# Patient Record
Sex: MALE | Race: WHITE | Employment: OTHER | URBAN - METROPOLITAN AREA
[De-identification: names, ages, dates, MRNs, and addresses within clinical notes are randomized per-mention and may not be internally consistent; named-entity substitution may affect disease eponyms.]

---

## 2022-11-29 ENCOUNTER — OFFICE VISIT (OUTPATIENT)
Dept: NEUROLOGY | Age: 67
End: 2022-11-29
Payer: MEDICARE

## 2022-11-29 VITALS
HEIGHT: 74 IN | HEART RATE: 80 BPM | WEIGHT: 213 LBS | BODY MASS INDEX: 27.34 KG/M2 | SYSTOLIC BLOOD PRESSURE: 152 MMHG | DIASTOLIC BLOOD PRESSURE: 84 MMHG

## 2022-11-29 DIAGNOSIS — R29.2 HYPERREFLEXIA: ICD-10-CM

## 2022-11-29 DIAGNOSIS — G81.91 RIGHT HEMIPARESIS (HCC): ICD-10-CM

## 2022-11-29 DIAGNOSIS — R26.9 GAIT DISTURBANCE: Primary | ICD-10-CM

## 2022-11-29 DIAGNOSIS — R25.1 TREMOR OF BOTH HANDS: ICD-10-CM

## 2022-11-29 PROCEDURE — 1036F TOBACCO NON-USER: CPT | Performed by: PSYCHIATRY & NEUROLOGY

## 2022-11-29 PROCEDURE — G8427 DOCREV CUR MEDS BY ELIG CLIN: HCPCS | Performed by: PSYCHIATRY & NEUROLOGY

## 2022-11-29 PROCEDURE — 3017F COLORECTAL CA SCREEN DOC REV: CPT | Performed by: PSYCHIATRY & NEUROLOGY

## 2022-11-29 PROCEDURE — 1123F ACP DISCUSS/DSCN MKR DOCD: CPT | Performed by: PSYCHIATRY & NEUROLOGY

## 2022-11-29 PROCEDURE — G8417 CALC BMI ABV UP PARAM F/U: HCPCS | Performed by: PSYCHIATRY & NEUROLOGY

## 2022-11-29 PROCEDURE — 99205 OFFICE O/P NEW HI 60 MIN: CPT | Performed by: PSYCHIATRY & NEUROLOGY

## 2022-11-29 PROCEDURE — G8484 FLU IMMUNIZE NO ADMIN: HCPCS | Performed by: PSYCHIATRY & NEUROLOGY

## 2022-11-29 RX ORDER — TRAMADOL HYDROCHLORIDE 50 MG/1
TABLET ORAL
COMMUNITY
Start: 2022-10-29

## 2022-11-29 RX ORDER — LORATADINE 10 MG/1
TABLET ORAL
COMMUNITY

## 2022-11-29 RX ORDER — CARVEDILOL 3.12 MG/1
TABLET ORAL
COMMUNITY
Start: 2022-10-25

## 2022-11-29 RX ORDER — FLUTICASONE PROPIONATE AND SALMETEROL 250; 50 UG/1; UG/1
POWDER RESPIRATORY (INHALATION)
COMMUNITY
Start: 2022-11-28

## 2022-11-29 RX ORDER — BUTALBITAL, ACETAMINOPHEN AND CAFFEINE 50; 325; 40 MG/1; MG/1; MG/1
TABLET ORAL
COMMUNITY
Start: 2022-11-18

## 2022-11-29 RX ORDER — LORAZEPAM 1 MG/1
1 TABLET ORAL DAILY
COMMUNITY
Start: 2017-01-31

## 2022-11-29 RX ORDER — OMEPRAZOLE 20 MG/1
CAPSULE, DELAYED RELEASE ORAL
COMMUNITY
Start: 2022-10-24

## 2022-11-29 RX ORDER — BUSPIRONE HYDROCHLORIDE 5 MG/1
TABLET ORAL
COMMUNITY
Start: 2022-11-28

## 2022-11-29 RX ORDER — ROSUVASTATIN CALCIUM 20 MG/1
TABLET, COATED ORAL
COMMUNITY
Start: 2022-10-25

## 2022-11-29 RX ORDER — CITALOPRAM 40 MG/1
TABLET ORAL
COMMUNITY
Start: 2022-11-18

## 2022-11-29 RX ORDER — GABAPENTIN 300 MG/1
CAPSULE ORAL
COMMUNITY
Start: 2022-11-10

## 2022-11-29 RX ORDER — FLUTICASONE PROPIONATE AND SALMETEROL 100; 50 UG/1; UG/1
POWDER RESPIRATORY (INHALATION)
COMMUNITY
Start: 2017-10-11

## 2022-11-29 RX ORDER — HYDROCODONE BITARTRATE AND ACETAMINOPHEN 7.5; 325 MG/1; MG/1
TABLET ORAL
COMMUNITY
Start: 2022-11-05

## 2022-11-29 RX ORDER — NITROGLYCERIN 400 UG/1
1 SPRAY ORAL EVERY 5 MIN PRN
COMMUNITY
Start: 2022-09-12

## 2022-11-29 NOTE — PROGRESS NOTES
Gay Kras  [address] [place], [address] [place] [address]  Phone: [phone] Fax [phone]  Won Cast MD      Patient: Kami Cordero  Provider: Won Cast MD    CC:   Chief Complaint   Patient presents with    New Patient    Tremors    Headache     Referring Provider:    History of Present Illness:     Kami Cordero is a 78 y.o. RH male who presents for evaluation of tremors. He is accompanied by his spouse. Patient presents for further evaluation of tremors. Tremor has been present in the bilateral hands, worse on the right, and appears to have both resting and action components. Symptoms at present over the last couple of years. Additionally there is been some weakness and stiffness of the upper extremities. Family history includes a mother with Parkinson's disease. There has been worsening gait disturbance with loss of balance and increased risk of falls. No symptoms suggestive of freezing of gait. He has had assistive devices but is able to walk unassisted. 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 No other visual disturbances. No significant speech or swallowing disturbances. Additionally there has been a history of persistent headaches localized in the bifrontal temporal regions. There is no prior history of chronic migraine. Currently taking Fioricet which have been given to him by an outside provider and taking it regularly, which only provides transient benefit. Cognitively there have been mild short-term memory deficits but no significant neuropsychiatric disturbances. No visual hallucinations. No RBD. Past medical history includes coronary artery bypass graft and continues with cardiology follow-up. Review of Systems:   Review of Systems   Constitutional:  Positive for fatigue. Negative for fever. HENT:  Negative for drooling. Eyes:  Negative for visual disturbance. Respiratory:  Negative for shortness of breath. Cardiovascular:  Negative for chest pain. Gastrointestinal:  Negative for abdominal pain. Genitourinary:  Negative for dysuria. Musculoskeletal:  Positive for gait problem and neck pain. Skin:  Negative for rash. Allergic/Immunologic: Negative for immunocompromised state. Neurological:  Positive for tremors and weakness. Psychiatric/Behavioral:  Positive for decreased concentration. Lab/Imaging Review:   I REVIEWED PERTINENT LABS, IMAGES, AND REPORTS WITH THE PATIENT PERSONALLY, DIRECTLY AND FULLY. THE MOST PERTINENT FINDINGS ARE NOTED BELOW:    Labs July 2022:  B12 638. CMP unremarkable. Past Medical History:     Past medical history, surgical history, social history, family history, medications, and allergies were reviewed and updated as appropriate.      PAST MEDICAL HISTORY:  Past Medical History:   Diagnosis Date    CAD (coronary artery disease)     COPD (chronic obstructive pulmonary disease) ([facility] [address]     Hyperlipidemia     Hypertension      PAST SURGICAL HISTORY:   Past Surgical History:   Procedure Laterality Date    CORONARY ARTERY BYPASS GRAFT       FAMILY HISTORY:  Family History   Problem Relation Age of Onset    Parkinson's Disease Mother     Heart Disease Brother       SOCIAL HISTORY:  Social History     Socioeconomic History    Marital status:      Spouse name: None    Number of children: None    Years of education: None    Highest education level: None   Tobacco Use    Smoking status: Never     Passive exposure: Never    Smokeless tobacco: Never   Substance and Sexual Activity    Alcohol use: Never    Drug use: Never       Medications/Allergies:     MEDICATIONS:   Outpatient Encounter Medications as of 11/29/2022   Medication Sig Dispense Refill    busPIRone (BUSPAR) 5 MG tablet       butalbital-acetaminophen-caffeine (FIORICET, ESGIC) -40 MG per tablet       carvedilol (COREG) 3.125 MG tablet       citalopram (CELEXA) 40 MG tablet       ELDERBERRY PO Take by mouth daily      fluticasone-salmeterol (ADVAIR) 250-50 MCG/ACT AEPB diskus inhaler       gabapentin (NEURONTIN) 300 MG capsule       HYDROcodone-acetaminophen (NORCO) 7.5-325 MG per tablet       loratadine (CLARITIN) 10 MG tablet Loratadine 10 MG Oral Tablet  TAKE 1 TABLET DAILY. Refills: 0    Active      LORazepam (ATIVAN) 1 MG tablet Take 1 mg by mouth daily. nitroGLYCERIN (NITROLINGUAL) 0.4 MG/SPRAY 0.4 mg spray Place 1 spray under the tongue every 5 minutes as needed      omeprazole (PRILOSEC) 20 MG delayed release capsule       rosuvastatin (CRESTOR) 20 MG tablet       traMADol (ULTRAM) 50 MG tablet       vitamin E 600 units capsule Vitamin E CAPS  TAKE 1 CAPSULE DAILY. Refills: 0    Active      ZOLPIDEM TARTRATE ER PO Take 10 mg by mouth nightly as needed      ASPIRIN 81 PO Aspirin 81 MG TABS  TAKE 1 TABLET DAILY. Refills: 0    Active      fluticasone-salmeterol (ADVAIR) 100-50 MCG/ACT AEPB diskus inhaler        No facility-administered encounter medications on file as of 11/29/2022. ALLERGIES:  No Known Allergies      Physical Exam:     BP (!) 152/84   Pulse 80   Ht 6' 2\" (1.88 m)   Wt 213 lb (96.6 kg)   BMI 27.35 kg/m²     General Exam:  General: Well developed, well nourished, in no apparent distress. HEENT: Normocephalic, atraumatic. Sclera anicteric. Oropharynx clear. Neck: Supple without masses  Cardiovascular: Regular rate and rhythm. No carotid bruits. Lungs: Non-labored breathing. Abdomen: Soft, nontender, nondistended. Extremities: Peripheral pulses intact. No edema and no rashes. Neurological Exam:     MS/Language/Speech:  Alert. Oriented x 3. Language fluent. Speech relatively clear. Cranial Nerves: PERRL. L eye enucleation (baseline). Right eye movements full with normal pursuits. No nystagmus. Face was symmetric with good activation and normal sensation. Tongue and palate were midline. Shoulder shrug symmetric. Motor:  There is mild weakness on the right upper extremity with slight pronator drift. Mild weakness with hip flexion bilaterally worse on the right. Tone was relatively normal in the upper extremities with no cogwheeling or spasticity. Mild elevation of tone in the lower extremities. Abnormal Movements: There is an irregular, jerky appearing tremor of the hands at rest, more noticeable on the right but not rhythmic. It becomes more prominent with hands outstretched and there is an action component with finger-nose-finger bilaterally but again not very rhythmic in appearance. No obvious dystonic posturing was noted. Rapid alternating movements do show some impaired coordination bilaterally in both upper and lower extremities particularly with foot tapping which were impaired on the right. Sensory: Normal to light touch throughout. Cerebellar: No ataxia or dysmetria with finger-nose-finger bilaterally. Reflexes (R/L): Biceps (2+/2+), Brachioradialis (2+/2+), Patellar (2+/2+), Ankle (3+/3+). Few beats of ankle clonus bilaterally, more prominent on the right. Leal's was negative. Gait: He can rise from a seated position without assistance but uses his arms. Posture is relatively upright. He has an unsteady gait with external rotation of the right lower extremity and a somewhat antalgic appearance. There is some reduced activation on the right particularly in the lower extremity. The above-mentioned tremors are present when walking. Arm swing looks relatively symmetric. No freezing or festination. Assessment and Plan:     General Pires is a 78 y.o. male who presents with the following issues:     Padmini Dangelo was seen today for new patient, tremors and headache. Diagnoses and all orders for this visit:    Gait disturbance    Right hemiparesis (HCC)    Tremor of both hands    Hyperreflexia      Patient presents for further evaluation of tremor of the hands.   His neurologic exam as noted above remarkable for mild hemiparesis, worse on the right in addition to some increased tone and hyperreflexia which would be concerning for CNS process. There could be some mild elements of parkinsonism though again this is confounded by the other features as noted above. Therefore I recommended a broad work-up to include MRI of the brain and cervical spine for further evaluation for any evidence of cerebrovascular ischemia for cervical myelopathy. We will hold off on any medication changes at this time until more data is obtained. Headaches do not sound consistent with chronic migraine and new onset headaches at this age would be atypical.  Therefore recommending the imaging as noted above. Counseled on fall precautions. Would highly recommend trials of physical therapy but we will try to obtain more data initially before we move forward with any other referrals. Follow up to be arranged. Signature: Rox Ceja MD      Date:  12/1/2022    [facility]   [facility]  Ph: [phone]  Fax: [phone]         I have personally interviewed and examined Mr. Laney Oshea and I have personally reviewed all relevant records including labs and imaging as noted above. I have written all aspects of this note. More than 50% of this time was used for counseling regarding my diagnosis, prognosis, and plans for management. Total visit time: 65 minutes.

## 2022-12-01 ASSESSMENT — ENCOUNTER SYMPTOMS
ABDOMINAL PAIN: 0
SHORTNESS OF BREATH: 0

## 2022-12-17 ENCOUNTER — HOSPITAL ENCOUNTER (OUTPATIENT)
Dept: MRI IMAGING | Age: 67
End: 2022-12-17
Payer: MEDICARE

## 2022-12-17 DIAGNOSIS — G81.91 RIGHT HEMIPARESIS (HCC): ICD-10-CM

## 2022-12-17 DIAGNOSIS — R25.1 TREMOR OF BOTH HANDS: ICD-10-CM

## 2022-12-17 DIAGNOSIS — R29.2 HYPERREFLEXIA: ICD-10-CM

## 2022-12-17 PROCEDURE — 6360000004 HC RX CONTRAST MEDICATION: Performed by: PSYCHIATRY & NEUROLOGY

## 2022-12-17 PROCEDURE — 72156 MRI NECK SPINE W/O & W/DYE: CPT

## 2022-12-17 PROCEDURE — 70553 MRI BRAIN STEM W/O & W/DYE: CPT

## 2022-12-17 PROCEDURE — A9579 GAD-BASE MR CONTRAST NOS,1ML: HCPCS | Performed by: PSYCHIATRY & NEUROLOGY

## 2022-12-17 RX ADMIN — GADOTERIDOL 19 ML: 279.3 INJECTION, SOLUTION INTRAVENOUS at 11:55

## 2022-12-21 ENCOUNTER — TELEPHONE (OUTPATIENT)
Dept: NEUROLOGY | Age: 67
End: 2022-12-21

## 2022-12-21 NOTE — TELEPHONE ENCOUNTER
Patients wife called to get results of husbands MRI from 12/17/22. Advised Dr Jose Flannery would call her with results after he has read them. She verbalized understanding.

## 2023-01-03 ENCOUNTER — TELEPHONE (OUTPATIENT)
Dept: NEUROLOGY | Age: 68
End: 2023-01-03

## 2023-01-10 NOTE — TELEPHONE ENCOUNTER
Spoke to patient and briefly discussed results of the MRI brain and cervical spine. Suggested follow-up in the office to discuss the next steps. Please make a follow-up appointment on 1/27 at noon.

## 2023-01-12 ENCOUNTER — TELEPHONE (OUTPATIENT)
Dept: NEUROLOGY | Age: 68
End: 2023-01-12

## 2023-01-27 ENCOUNTER — OFFICE VISIT (OUTPATIENT)
Dept: NEUROLOGY | Age: 68
End: 2023-01-27
Payer: MEDICARE

## 2023-01-27 VITALS
DIASTOLIC BLOOD PRESSURE: 80 MMHG | HEART RATE: 68 BPM | HEIGHT: 74 IN | WEIGHT: 212 LBS | SYSTOLIC BLOOD PRESSURE: 110 MMHG | BODY MASS INDEX: 27.21 KG/M2

## 2023-01-27 DIAGNOSIS — R26.9 GAIT DISTURBANCE: ICD-10-CM

## 2023-01-27 DIAGNOSIS — R90.89 ABNORMAL FINDING ON MRI OF BRAIN: ICD-10-CM

## 2023-01-27 DIAGNOSIS — R51.9 HEADACHE, CHRONIC DAILY: ICD-10-CM

## 2023-01-27 DIAGNOSIS — G20 PRIMARY PARKINSONISM (HCC): Primary | ICD-10-CM

## 2023-01-27 DIAGNOSIS — M79.2 NEUROPATHIC PAIN: ICD-10-CM

## 2023-01-27 DIAGNOSIS — G62.9 PERIPHERAL POLYNEUROPATHY: ICD-10-CM

## 2023-01-27 PROCEDURE — 1123F ACP DISCUSS/DSCN MKR DOCD: CPT | Performed by: PSYCHIATRY & NEUROLOGY

## 2023-01-27 PROCEDURE — 1036F TOBACCO NON-USER: CPT | Performed by: PSYCHIATRY & NEUROLOGY

## 2023-01-27 PROCEDURE — G8484 FLU IMMUNIZE NO ADMIN: HCPCS | Performed by: PSYCHIATRY & NEUROLOGY

## 2023-01-27 PROCEDURE — G8417 CALC BMI ABV UP PARAM F/U: HCPCS | Performed by: PSYCHIATRY & NEUROLOGY

## 2023-01-27 PROCEDURE — G8427 DOCREV CUR MEDS BY ELIG CLIN: HCPCS | Performed by: PSYCHIATRY & NEUROLOGY

## 2023-01-27 PROCEDURE — 3017F COLORECTAL CA SCREEN DOC REV: CPT | Performed by: PSYCHIATRY & NEUROLOGY

## 2023-01-27 PROCEDURE — 99215 OFFICE O/P EST HI 40 MIN: CPT | Performed by: PSYCHIATRY & NEUROLOGY

## 2023-01-27 ASSESSMENT — ENCOUNTER SYMPTOMS
ABDOMINAL PAIN: 0
SHORTNESS OF BREATH: 0

## 2023-01-27 NOTE — PROGRESS NOTES
Gay Kras  [address], [address]  Phone: [phone] Fax [phone]  Won Cast MD      Patient: Kami Cordero  Provider: Won Cast MD    CC:   Chief Complaint   Patient presents with    Follow-up     Gait disturbance     Referring Provider:    History of Present Illness:     Kami Cordero is a 78 y.o. RH male who presents for follow-up of tremors. He is accompanied by his spouse. He was last seen November 2022. Patient presents for further investigation follow-up of tremors affecting the hands, worse on the right, with both resting and action components and some other mild features concerning for parkinsonism. His family history does include Parkinson's disease in his mother. Additionally, there has been weakness and stiffness within the upper extremities and worsening gait disturbance involving loss of balance and dramatically increasing his risk of falls. There has not been any symptoms suggestive of freezing of gait. Despite his poor balance he has been walking without any assistive devices. 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 No other visual disturbances. No significant speech or swallowing disturbances. Additionally there has been a history of persistent headaches localized in the bifrontal temporal regions. There is no prior history of chronic migraine. Currently taking Fioricet which have been given to him by an outside provider and taking it regularly, which only provides transient benefit. Cognitively there have been mild short-term memory deficits but no significant neuropsychiatric disturbances. No visual hallucinations. No RBD. Review of his MRI of the brain and cervical spine with him today are noted below. Overall symptoms remain relatively unchanged. Review of Systems:   Review of Systems   Constitutional:  Positive for fatigue. Negative for fever. HENT:  Negative for drooling.     Eyes:  Negative for visual disturbance. Respiratory:  Negative for shortness of breath. Cardiovascular:  Negative for chest pain. Gastrointestinal:  Negative for abdominal pain. Genitourinary:  Negative for dysuria. Musculoskeletal:  Positive for gait problem and neck pain. Skin:  Negative for rash. Allergic/Immunologic: Negative for immunocompromised state. Neurological:  Positive for tremors and weakness. Psychiatric/Behavioral:  Positive for decreased concentration. Lab/Imaging Review:   I REVIEWED PERTINENT LABS, IMAGES, AND REPORTS WITH THE PATIENT PERSONALLY, DIRECTLY AND FULLY. THE MOST PERTINENT FINDINGS ARE NOTED BELOW:    MRI Brain December 2022:  Age-related senescent changes are seen with sulcal and ventricular prominence. No evidence of acute ischemia. Diffuse mild dural thickening is noted, with differential considerations including intracranial hypotension, CSF leak, or previous lumbar puncture. No abnormal extra-axial fluid collections. No evidence of mass-effect. Cerebellar involutional changes are demonstrated but visualized brainstem structures are unremarkable. The ventricular system and CSF-containing spaces are unremarkable. Incidentally mild frontal and ethmoid sinus inflammatory disease. MRI Cervical Spine December 2022:  Multilevel degenerative disc disease and facet arthropathy with notable mild spinal canal stenoses at C4-C5 and C6-C7. Bilateral neuroforaminal stenoses, severe at several different levels. Labs July 2022:  B12 638. CMP unremarkable. Past Medical History:     Past medical history, surgical history, social history, family history, medications, and allergies were reviewed and updated as appropriate.      PAST MEDICAL HISTORY:  Past Medical History:   Diagnosis Date    CAD (coronary artery disease)     COPD (chronic obstructive pulmonary disease) ([facility] [address]     Hyperlipidemia     Hypertension      PAST SURGICAL HISTORY:   Past Surgical History: Procedure Laterality Date    CORONARY ARTERY BYPASS GRAFT       FAMILY HISTORY:  Family History   Problem Relation Age of Onset    Parkinson's Disease Mother     Heart Disease Brother       SOCIAL HISTORY:  Social History     Socioeconomic History    Marital status:      Spouse name: None    Number of children: None    Years of education: None    Highest education level: None   Tobacco Use    Smoking status: Never     Passive exposure: Never    Smokeless tobacco: Never   Substance and Sexual Activity    Alcohol use: Never    Drug use: Never       Medications/Allergies:     MEDICATIONS:   Outpatient Encounter Medications as of 1/27/2023   Medication Sig Dispense Refill    pregabalin (LYRICA) 75 MG capsule Take 1 capsule by mouth 3 times daily for 180 days. Max Daily Amount: 225 mg 90 capsule 5    butalbital-acetaminophen-caffeine (FIORICET, ESGIC) -40 MG per tablet       citalopram (CELEXA) 40 MG tablet       ELDERBERRY PO Take by mouth daily      fluticasone-salmeterol (ADVAIR) 250-50 MCG/ACT AEPB diskus inhaler       gabapentin (NEURONTIN) 300 MG capsule       HYDROcodone-acetaminophen (NORCO) 7.5-325 MG per tablet       loratadine (CLARITIN) 10 MG tablet Loratadine 10 MG Oral Tablet  TAKE 1 TABLET DAILY. Refills: 0    Active      LORazepam (ATIVAN) 1 MG tablet Take 1 mg by mouth daily. nitroGLYCERIN (NITROLINGUAL) 0.4 MG/SPRAY 0.4 mg spray Place 1 spray under the tongue every 5 minutes as needed      omeprazole (PRILOSEC) 20 MG delayed release capsule       rosuvastatin (CRESTOR) 20 MG tablet       traMADol (ULTRAM) 50 MG tablet       vitamin E 600 units capsule Vitamin E CAPS  TAKE 1 CAPSULE DAILY. Refills: 0    Active      ASPIRIN 81 PO Aspirin 81 MG TABS  TAKE 1 TABLET DAILY.    Refills: 0    Active      fluticasone-salmeterol (ADVAIR) 100-50 MCG/ACT AEPB diskus inhaler       busPIRone (BUSPAR) 5 MG tablet  (Patient not taking: Reported on 1/27/2023)      carvedilol (COREG) 3.125 MG tablet  (Patient not taking: Reported on 1/27/2023)      ZOLPIDEM TARTRATE ER PO Take 10 mg by mouth nightly as needed (Patient not taking: Reported on 1/27/2023)       No facility-administered encounter medications on file as of 1/27/2023. ALLERGIES:  Allergies   Allergen Reactions    Latex     Ace Inhibitors Other (See Comments)    Morphine     Penicillins Swelling    Sulfamethoxazole-Trimethoprim          Physical Exam:     /80   Pulse 68   Ht 6' 2\" (1.88 m)   Wt 212 lb (96.2 kg)   BMI 27.22 kg/m²     General Exam:  General: Well developed, well nourished, in no apparent distress. HEENT: Normocephalic, atraumatic. Sclera anicteric. Oropharynx clear. Neck: Supple without masses  Cardiovascular: Regular rate and rhythm. No carotid bruits. Lungs: Non-labored breathing. Abdomen: Soft, nontender, nondistended. Extremities: Peripheral pulses intact. No edema and no rashes. Neurological Exam:     MS/Language/Speech:  Alert. Oriented x 3. Language fluent. Speech relatively clear. Cranial Nerves: PERRL. L eye enucleation (baseline). Right eye movements full with normal pursuits. No nystagmus. Face was symmetric with good activation and normal sensation. Tongue and palate were midline. Shoulder shrug symmetric. Motor: There is mild weakness on the right upper extremity with slight pronator drift. Mild weakness with hip flexion bilaterally worse on the right. Tone was relatively normal in the upper extremities with no cogwheeling or spasticity. Mild elevation of tone in the lower extremities. Abnormal Movements: There is an irregular, jerky appearing tremor of the hands at rest, more noticeable on the right but not rhythmic. It becomes more prominent with hands outstretched and there is an action component with finger-nose-finger bilaterally but again not very rhythmic in appearance. No obvious dystonic posturing was noted.   Rapid alternating movements do show some impaired coordination bilaterally in both upper and lower extremities particularly with foot tapping which were impaired on the right. Sensory: Normal to light touch throughout. Cerebellar: No ataxia or dysmetria with finger-nose-finger bilaterally. Reflexes (R/L): Biceps (2+/2+), Brachioradialis (2+/2+), Patellar (2+/2+), Ankle (3+/3+). Few beats of ankle clonus bilaterally, more prominent on the right. Leal's was negative. Gait: He can rise from a seated position without assistance but uses his arms. Posture is relatively upright. He has an unsteady gait with external rotation of the right lower extremity and a somewhat antalgic appearance. There is some reduced activation on the right particularly in the lower extremity. The above-mentioned tremors are present when walking. Arm swing looks relatively symmetric. No freezing or festination. Assessment and Plan:     Marybel Padilla is a 78 y.o. male who presents with the following issues:     Colin Hamilton was seen today for follow-up. Diagnoses and all orders for this visit:    Primary parkinsonism ([address]  -     NM BRAIN SPECT; Future    Gait disturbance  -     IR LUMBAR PUNCTURE FOR DIAGNOSIS; Future  -     Flow Cytometry/Hematopathology Misc; Future    Abnormal finding on MRI of brain  -     IR LUMBAR PUNCTURE FOR DIAGNOSIS; Future  -     Flow Cytometry/Hematopathology Misc; Future    Peripheral polyneuropathy  -     pregabalin (LYRICA) 75 MG capsule; Take 1 capsule by mouth 3 times daily for 180 days. Max Daily Amount: 225 mg    Neuropathic pain  -     pregabalin (LYRICA) 75 MG capsule; Take 1 capsule by mouth 3 times daily for 180 days. Max Daily Amount: 225 mg    Headache, chronic daily      Patient presents for follow-up and further evaluation of an ongoing neurologic complex with several different signs and symptoms as noted above. There are signs of parkinsonism though there is also been noted to be a mild right hemiparesis with more spasticity.       MRI of the brain failed to show any obvious etiology but did more incidentally note diffuse meningeal enhancement of unclear etiology which, in the setting of other neurologic deficits and chronic headaches, needs to be evaluated further. Review of his MRI of the cervical spine today does show multilevel degenerative changes but no significant central canal stenosis. Moving forward I have recommended a DaTscan given the evidence of parkinsonism on his exam.  Though he does not clearly fit with an idiopathic PD I cannot rule out any other atypical parkinsonian syndrome. Also need full work with diagnostic CSF studies for further work-up given the findings of diffuse pachymeningeal enhancement. He continues to have difficulty with chronic neuropathic pain and suggested trial of Lyrica 75 mg 3 times a day for symptomatic management. He will also continue follow-up with pain management. Headaches have not appeared very consistent with that of chronic migraine and new onset headaches at this age would be atypical prompting further work-up as noted above. He continues to take Fioricet. Would be interested in finding some more appropriate long-term treatment. We can reassess this depending on results of studies noted above. Counseled on fall precautions. Would recommend trials of physical therapy but we will try to obtain more data initially before we move forward with any other referrals. Follow up to be arranged. Signature: Rox Ceja MD      Date:  1/30/2023    [facility]   [address] , [address]  Ph: [phone]  Fax: [phone]         I have personally interviewed and examined Mr. Laney Oshea and I have personally reviewed all relevant records including labs and imaging as noted above. I have written all aspects of this note. More than 50% of this time was used for counseling regarding my diagnosis, prognosis, and plans for management.  Total visit time: 45 minutes.

## 2023-01-29 RX ORDER — PREGABALIN 75 MG/1
75 CAPSULE ORAL 3 TIMES DAILY
Qty: 90 CAPSULE | Refills: 5 | Status: SHIPPED | OUTPATIENT
Start: 2023-01-29 | End: 2023-07-28

## 2023-02-02 ENCOUNTER — HOSPITAL ENCOUNTER (OUTPATIENT)
Dept: INTERVENTIONAL RADIOLOGY/VASCULAR | Age: 68
Discharge: HOME | End: 2023-02-02
Payer: MEDICARE

## 2023-02-02 VITALS
SYSTOLIC BLOOD PRESSURE: 147 MMHG | HEART RATE: 52 BPM | HEIGHT: 74 IN | WEIGHT: 212 LBS | BODY MASS INDEX: 27.21 KG/M2 | TEMPERATURE: 97.5 F | OXYGEN SATURATION: 95 % | DIASTOLIC BLOOD PRESSURE: 85 MMHG | RESPIRATION RATE: 16 BRPM

## 2023-02-02 DIAGNOSIS — R26.9 GAIT DISTURBANCE: ICD-10-CM

## 2023-02-02 DIAGNOSIS — R90.89 ABNORMAL FINDING ON MRI OF BRAIN: ICD-10-CM

## 2023-02-02 LAB
APPEARANCE FLD: CLEAR
COLOR FLD: COLORLESS
GLUCOSE CSF-MCNC: 60 MG/DL (ref 40–70)
NUC CELL # FLD: 1 /CU MM
PROT CSF-MCNC: 64 MG/DL (ref 15–45)
RBC # FLD: 0 /CU MM
SPECIMEN SOURCE FLD: NORMAL
TUBE # CSF: ABNORMAL
TUBE # CSF: NORMAL

## 2023-02-02 PROCEDURE — 88185 FLOWCYTOMETRY/TC ADD-ON: CPT

## 2023-02-02 PROCEDURE — 86618 LYME DISEASE ANTIBODY: CPT

## 2023-02-02 PROCEDURE — 87205 SMEAR GRAM STAIN: CPT

## 2023-02-02 PROCEDURE — 88184 FLOWCYTOMETRY/ TC 1 MARKER: CPT

## 2023-02-02 PROCEDURE — 2709999900 IR LUMBAR PUNCTURE FOR DIAGNOSIS

## 2023-02-02 PROCEDURE — 86592 SYPHILIS TEST NON-TREP QUAL: CPT

## 2023-02-02 PROCEDURE — 82945 GLUCOSE OTHER FLUID: CPT

## 2023-02-02 PROCEDURE — 82164 ANGIOTENSIN I ENZYME TEST: CPT

## 2023-02-02 PROCEDURE — 84157 ASSAY OF PROTEIN OTHER: CPT

## 2023-02-02 PROCEDURE — 2500000003 HC RX 250 WO HCPCS: Performed by: PHYSICIAN ASSISTANT

## 2023-02-02 PROCEDURE — 89050 BODY FLUID CELL COUNT: CPT

## 2023-02-02 RX ORDER — LIDOCAINE HYDROCHLORIDE 20 MG/ML
INJECTION, SOLUTION INFILTRATION; PERINEURAL
Status: COMPLETED | OUTPATIENT
Start: 2023-02-02 | End: 2023-02-02

## 2023-02-02 RX ADMIN — LIDOCAINE HYDROCHLORIDE 10 ML: 20 INJECTION, SOLUTION INFILTRATION; PERINEURAL at 11:29

## 2023-02-02 ASSESSMENT — PAIN DESCRIPTION - DESCRIPTORS: DESCRIPTORS: ACHING;DISCOMFORT

## 2023-02-02 ASSESSMENT — PAIN DESCRIPTION - PAIN TYPE: TYPE: CHRONIC PAIN

## 2023-02-02 ASSESSMENT — PAIN DESCRIPTION - FREQUENCY: FREQUENCY: CONTINUOUS

## 2023-02-02 ASSESSMENT — PAIN SCALES - GENERAL: PAINLEVEL_OUTOF10: 8

## 2023-02-02 ASSESSMENT — PAIN DESCRIPTION - LOCATION: LOCATION: BACK;NECK;LEG;FOOT

## 2023-02-02 NOTE — DISCHARGE INSTRUCTIONS
If you have any questions about your procedure, please call the Interventional Radiology department at [phone]. After business hours (5pm) and weekends, call the answering service at [phone] and ask for the Radiologist on call to be paged. Si tiene Preguntas acerca del procedimiento, por favor llame al departamento de Radiología Intervencional al [phone]. Después de horas de oficina (5 pm) y los fines de [place], llamar al Lian Robles al [phone] y pregunte por el Radiologo de [facility].

## 2023-02-02 NOTE — PROGRESS NOTES
TRANSFER - OUT REPORT:           Verbal report given to Neeta Luna RN(name) on Car Matamoros  being transferred to IR Recovery 4(unit) for routine post-op              Report consisted of patients Situation, Background, Assessment and      Recommendations(SBAR). Information from the following report(s) SBAR and Procedure Summary was reviewed with the receiving nurse. Opportunity for questions and clarification was provided. Pt tolerated procedure well.            VITALS:  BP (!) 164/79   Pulse 54   Temp 97.5 °F (36.4 °C) (Temporal)   Resp 16   Ht 6' 2\" (1.88 m)   Wt 212 lb (96.2 kg)   SpO2 96%   BMI 27.22 kg/m²

## 2023-02-02 NOTE — OR NURSING
Recovery period without difficulty. Pt alert and oriented and denies pain. Dressing is clean, dry, and intact. Reviewed discharge instructions with patient who verbalized understanding. Pt ambulatory off the unit with no distress obesrved.

## 2023-02-03 LAB
FLOW CYTOMETRY RESULTS: NORMAL
REAGIN AB CSF QL: NON REACTIVE
SPECIMEN SOURCE: NORMAL
TEST ORDERED: NORMAL

## 2023-02-05 LAB
BACTERIA SPEC CULT: NORMAL
GRAM STN SPEC: NORMAL
GRAM STN SPEC: NORMAL
SERVICE CMNT-IMP: NORMAL

## 2023-02-06 LAB — ANGIOTENSIN CONVERTING ENZYME CSF: <1.5 U/L (ref 0–3.1)

## 2023-02-07 LAB
BACTERIA SPEC CULT: NORMAL
GRAM STN SPEC: NORMAL
GRAM STN SPEC: NORMAL
SERVICE CMNT-IMP: NORMAL

## 2023-02-09 LAB
B BURGDOR IGG CSF-ACNC: <0.08 INDEX (ref 0–0.09)
B BURGDOR IGM CSF-ACNC: <0.06 INDEX (ref 0–0.06)

## 2024-03-19 ENCOUNTER — TELEPHONE (OUTPATIENT)
Dept: NEUROLOGY | Age: 69
End: 2024-03-19

## 2024-03-19 NOTE — TELEPHONE ENCOUNTER
Patient called  and left a voicemail stating he would like another order put in and scheduled for Nuclear medicine. Patient was sick at the time his last appointment was scheduled in Jan 2023.

## 2024-04-05 ENCOUNTER — HOSPITAL ENCOUNTER (OUTPATIENT)
Dept: NUCLEAR MEDICINE | Age: 69
End: 2024-04-05
Payer: MEDICARE

## 2024-04-05 DIAGNOSIS — G20.C PRIMARY PARKINSONISM (HCC): ICD-10-CM

## 2024-04-05 PROCEDURE — 78803 RP LOCLZJ TUM SPECT 1 AREA: CPT

## 2024-04-05 PROCEDURE — 3430000000 HC RX DIAGNOSTIC RADIOPHARMACEUTICAL: Performed by: PSYCHIATRY & NEUROLOGY

## 2024-04-05 PROCEDURE — 2580000003 HC RX 258: Performed by: PSYCHIATRY & NEUROLOGY

## 2024-04-05 PROCEDURE — A9584 IODINE I-123 IOFLUPANE: HCPCS | Performed by: PSYCHIATRY & NEUROLOGY

## 2024-04-05 RX ORDER — SODIUM CHLORIDE 0.9 % (FLUSH) 0.9 %
20 SYRINGE (ML) INJECTION ONCE AS NEEDED
Status: COMPLETED | OUTPATIENT
Start: 2024-04-05 | End: 2024-04-05

## 2024-04-05 RX ADMIN — IOFLUPANE I-123 4.8 MILLICURIE: 2 INJECTION, SOLUTION INTRAVENOUS at 09:45

## 2024-04-05 RX ADMIN — SODIUM CHLORIDE, PRESERVATIVE FREE 20 ML: 5 INJECTION INTRAVENOUS at 09:45

## 2024-04-29 ENCOUNTER — TELEPHONE (OUTPATIENT)
Dept: NEUROLOGY | Age: 69
End: 2024-04-29

## 2024-04-30 NOTE — TELEPHONE ENCOUNTER
Spoke to patient.  Discussed results of DaTscan which were normal.  He will plan to follow-up as scheduled.

## 2024-07-28 NOTE — PROGRESS NOTES
[facility] NEUROLOGY  [address], [address]  [place] [address]  Phone: [phone] Fax [phone]  Supa Upton MD      Patient: Simón Acevedo  Provider: Supa Upton MD    CC:   Chief Complaint   Patient presents with    Follow-up     Parkinson's     Referring Provider:    History of Present Illness:     Simón Acevedo is a 69 y.o. RH male who presents for follow-up of tremors.     He is accompanied by his spouse.  He has not been seen in the office since January 2023.     Patient presents for follow-up and continued management of a tremor of the hands, worse on the right, in setting of a family history of Parkinson's disease in his mother.  However he has had very little evidence of parkinsonism on exam and he has also had a normal DaTscan.  He actually reports that his tremor is somewhat improved since his last visit.    He does appear to have more difficulty with his right side in general both in the right upper and right lower extremity though this also seems to be associated with chronic pain.  He has chronic neck pain and stiffness which continues to be a problem for him and he also has chronic lower back pain and pain in his knees.  He has a gait disturbance and appears to have some loss of activation on the right side when he walks though again this is also coupled with complaints of pain.  He is seen today walking unassisted.  He denies any recent falls.    He also continues to have a pattern of chronic daily headaches, localized within both the occipital regions and radiating frontally.  He denies any associated visual disturbances or photophobia but sometimes may get nauseated.  He has no other prior history of chronic migraine.  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    Currently he is taking several prescription pain medications including opioids for the chronic spinal and skeletal pain.  He recalls seeing pain management in the past but they \"never did much for

## 2024-07-29 ENCOUNTER — OFFICE VISIT (OUTPATIENT)
Dept: NEUROLOGY | Age: 69
End: 2024-07-29
Payer: MEDICARE

## 2024-07-29 VITALS
OXYGEN SATURATION: 95 % | HEIGHT: 74 IN | HEART RATE: 55 BPM | BODY MASS INDEX: 27.46 KG/M2 | SYSTOLIC BLOOD PRESSURE: 145 MMHG | DIASTOLIC BLOOD PRESSURE: 80 MMHG | WEIGHT: 214 LBS

## 2024-07-29 DIAGNOSIS — R25.1 TREMOR OF BOTH HANDS: Primary | ICD-10-CM

## 2024-07-29 DIAGNOSIS — R90.89 ABNORMAL FINDING ON MRI OF BRAIN: ICD-10-CM

## 2024-07-29 DIAGNOSIS — R51.9 CHRONIC DAILY HEADACHE: ICD-10-CM

## 2024-07-29 DIAGNOSIS — M54.2 CHRONIC NECK PAIN WITH ABNORMAL NEUROLOGIC EXAMINATION: ICD-10-CM

## 2024-07-29 DIAGNOSIS — G89.29 CHRONIC NECK PAIN WITH ABNORMAL NEUROLOGIC EXAMINATION: ICD-10-CM

## 2024-07-29 PROCEDURE — G8419 CALC BMI OUT NRM PARAM NOF/U: HCPCS | Performed by: PSYCHIATRY & NEUROLOGY

## 2024-07-29 PROCEDURE — 1123F ACP DISCUSS/DSCN MKR DOCD: CPT | Performed by: PSYCHIATRY & NEUROLOGY

## 2024-07-29 PROCEDURE — G8427 DOCREV CUR MEDS BY ELIG CLIN: HCPCS | Performed by: PSYCHIATRY & NEUROLOGY

## 2024-07-29 PROCEDURE — 3017F COLORECTAL CA SCREEN DOC REV: CPT | Performed by: PSYCHIATRY & NEUROLOGY

## 2024-07-29 PROCEDURE — 1036F TOBACCO NON-USER: CPT | Performed by: PSYCHIATRY & NEUROLOGY

## 2024-07-29 PROCEDURE — 99215 OFFICE O/P EST HI 40 MIN: CPT | Performed by: PSYCHIATRY & NEUROLOGY

## 2024-07-29 RX ORDER — LOSARTAN POTASSIUM 100 MG/1
100 TABLET ORAL DAILY
COMMUNITY

## 2024-07-29 RX ORDER — TOPIRAMATE 25 MG/1
25 TABLET ORAL 2 TIMES DAILY
Qty: 60 TABLET | Refills: 5 | Status: SHIPPED | OUTPATIENT
Start: 2024-07-29

## 2024-07-29 NOTE — PATIENT INSTRUCTIONS
Start topiramate 25 mg tablets. Take 1 tablet in the morning and 1 tablet in the evening for headaches.  May also be somewhat helpful for tremor.    I will send a referral to pain management to consider injections.     We have ordered an MRI of your brain and cervical spine. Radiology will contact you to schedule this.

## 2024-08-19 ENCOUNTER — HOSPITAL ENCOUNTER (OUTPATIENT)
Dept: MRI IMAGING | Age: 69
Discharge: HOME | End: 2024-08-22
Attending: PSYCHIATRY & NEUROLOGY
Payer: MEDICARE

## 2024-08-19 DIAGNOSIS — R90.89 ABNORMAL FINDING ON MRI OF BRAIN: ICD-10-CM

## 2024-08-19 DIAGNOSIS — M54.2 CHRONIC NECK PAIN WITH ABNORMAL NEUROLOGIC EXAMINATION: ICD-10-CM

## 2024-08-19 DIAGNOSIS — G89.29 CHRONIC NECK PAIN WITH ABNORMAL NEUROLOGIC EXAMINATION: ICD-10-CM

## 2024-08-19 DIAGNOSIS — R25.1 TREMOR OF BOTH HANDS: ICD-10-CM

## 2024-08-19 DIAGNOSIS — R51.9 CHRONIC DAILY HEADACHE: ICD-10-CM

## 2024-08-19 PROCEDURE — 6360000004 HC RX CONTRAST MEDICATION: Performed by: PSYCHIATRY & NEUROLOGY

## 2024-08-19 PROCEDURE — 72156 MRI NECK SPINE W/O & W/DYE: CPT

## 2024-08-19 PROCEDURE — A9579 GAD-BASE MR CONTRAST NOS,1ML: HCPCS | Performed by: PSYCHIATRY & NEUROLOGY

## 2024-08-19 PROCEDURE — 2580000003 HC RX 258: Performed by: PSYCHIATRY & NEUROLOGY

## 2024-08-19 PROCEDURE — 70553 MRI BRAIN STEM W/O & W/DYE: CPT

## 2024-08-19 RX ORDER — SODIUM CHLORIDE 0.9 % (FLUSH) 0.9 %
10 SYRINGE (ML) INJECTION AS NEEDED
Status: DISCONTINUED | OUTPATIENT
Start: 2024-08-19 | End: 2024-08-23 | Stop reason: HOSPADM

## 2024-08-19 RX ADMIN — SODIUM CHLORIDE, PRESERVATIVE FREE 10 ML: 5 INJECTION INTRAVENOUS at 15:50

## 2024-08-19 RX ADMIN — GADOTERIDOL 20 ML: 279.3 INJECTION, SOLUTION INTRAVENOUS at 15:50

## 2024-10-21 NOTE — PROGRESS NOTES
Previous interventions:  Procedure Date Results   ***                                     Previous medication trials: Listed:  Class Medication Results   NSAIDs ***    Oral steroids     Tylenol     Antiepileptics Lyrica, Gabapentin    Antidepressants     Relaxants     Topicals/transdermaI patches     Narcotics Benedict  Tramadol      Previous tests/studies:  Study Date Results   MRI CERVICAL W WO CONTRAST 8/19/2024 Narrative & Impression  EXAMINATION: MRI CERVICAL SPINE W WO CONTRAST 8/19/2024 4:12 PM     ACCESSION NUMBER: [id number]     COMPARISON: 12/17/2022     INDICATION: Cervicalgia; Other chronic pain     TECHNIQUE: Multiplanar multi-echo MRI of the cervical spine was performed with  and without the use of intravenous contrast.  The postcontrast images were  obtained following intravenous administration of 20 mL of Prohance.     FINDINGS: Marrow signal is normal with disco genic marrow changes greatest at  C4-5. Spinal cord signal is normal. There is no abnormal enhancement.     At the C4-5 level there is disc osteophyte complex with a confluent broad-based  left uncovertebral joint osteophytic process which creates severe narrowing of  the left neural foramen with some encroachment on the left lateral recess.     At the C5-6 level there is moderate disc osteophyte complex which is effacing  the anterior CSF and abutting the cord. Mild uncovertebral joint hypertrophy is  also present mildly narrowing the neural foramina.     At the C6-7 level there is a small disc osteophyte complex without spinal  stenosis or nerve root impingement.     IMPRESSION:  Degenerative changes greatest at C4-5 and C5-6 as described. Uncovertebral joint  hypertrophy is severe on the left at the C4-5 level. CT could better demonstrate  the degree of bony narrowing as clinically necessary.                                 Previous therapies:  Type of Therapy Date Results   ***

## 2024-10-22 ENCOUNTER — OFFICE VISIT (OUTPATIENT)
Age: 69
End: 2024-10-22
Payer: MEDICARE

## 2024-10-22 DIAGNOSIS — M54.12 CERVICAL RADICULOPATHY: Primary | ICD-10-CM

## 2024-10-22 PROCEDURE — 1123F ACP DISCUSS/DSCN MKR DOCD: CPT | Performed by: ANESTHESIOLOGY

## 2024-10-22 PROCEDURE — 99204 OFFICE O/P NEW MOD 45 MIN: CPT | Performed by: ANESTHESIOLOGY

## 2024-10-22 NOTE — PROGRESS NOTES
Chronic Pain Consult Note      Plan:     A comprehensive pain management plan may consist of the following: Testing, Therapy, Medications, Interventions, Consults, and Follow up.    Cervical spondylosis without myelopathy  Potential for future C2-C4 MBB/RFA  Cervical radiculopathy  Scheduled for interlaminar C5-6 KARINA  Potential series  Patient has not yet spoken to or been evaluated by a surgical specialist  Chronic neck pain  Encouraged continuation of active healthy lifestyle  Patient receives controlled medication from outside provider, PCP.  This includes tramadol as well as Norco.  Patient also receives Lyrica from outside provider.  He is taking these medications as directed and reports benefit from their use without side effects.    General Recommendations: The pain condition that the patient suffers from is best treated with a multidisciplinary approach that involves an increase in physical activity to prevent de-conditioning and worsening of the pain cycle, as well as psychological counseling (formal and/or informal) to address the co morbid psychological effects of pain. Treatment will often involve judicious use of pain medications and interventional procedures to decrease the pain, allowing the patient to participate in the physical activity that will ultimately produce long-lasting pain reductions. The goal of the multidisciplinary approach is to return the patient to a higher level of overall function and to restore their ability to perform activities of daily living.      Referring Provider: Supa Upton MD  Assessment:      Chief Complaint: No chief complaint on file.      Simón Acevedo is a 69 y.o. male [facility] for the following diagnoses:    Diagnosis:  No diagnosis found.      Subjective:      HPI:  Mr. Acevedo is seen in consultation at the request of Supa Upton MD for evaluation and recommendations regarding the above diagnoses and the below

## 2024-11-12 ENCOUNTER — OFFICE VISIT (OUTPATIENT)
Dept: ORTHOPEDIC SURGERY | Age: 69
End: 2024-11-12
Payer: MEDICARE

## 2024-11-12 DIAGNOSIS — M54.12 CERVICAL RADICULOPATHY: Primary | ICD-10-CM

## 2024-11-12 PROCEDURE — 62321 NJX INTERLAMINAR CRV/THRC: CPT | Performed by: ANESTHESIOLOGY

## 2024-11-12 RX ORDER — BETAMETHASONE SODIUM PHOSPHATE AND BETAMETHASONE ACETATE 3; 3 MG/ML; MG/ML
30 INJECTION, SUSPENSION INTRA-ARTICULAR; INTRALESIONAL; INTRAMUSCULAR; SOFT TISSUE ONCE
Status: COMPLETED | OUTPATIENT
Start: 2024-11-12 | End: 2024-11-12

## 2024-11-12 RX ADMIN — BETAMETHASONE SODIUM PHOSPHATE AND BETAMETHASONE ACETATE 30 MG: 3; 3 INJECTION, SUSPENSION INTRA-ARTICULAR; INTRALESIONAL; INTRAMUSCULAR; SOFT TISSUE at 13:52

## 2024-11-12 NOTE — PROGRESS NOTES
Procedure Date: 2024      Location: [facility]       Procedure: C5/6 IL KARINA       Time Out performed prior to start of the procedure:       Harsha Henley M.D.  performed the following reviews on Simón Acevedo 1955 prior to the start of the procedure:       patient was identified by name and     agreement on procedure being performed was verified   risks and benefits explained to patient by the provider  procedure site verified as   patient was positioned for comfort   consent signed and verified for procedure       Time:  1:15 PM        Procedure performed by:   Harsha Henley M.D.       Patient assisted by:   FIONA ROBLES MA

## 2024-12-12 ENCOUNTER — OFFICE VISIT (OUTPATIENT)
Age: 69
End: 2024-12-12
Payer: MEDICARE

## 2024-12-12 DIAGNOSIS — M47.812 CERVICAL SPONDYLOSIS WITHOUT MYELOPATHY: Primary | ICD-10-CM

## 2024-12-12 PROCEDURE — 3017F COLORECTAL CA SCREEN DOC REV: CPT | Performed by: ANESTHESIOLOGY

## 2024-12-12 PROCEDURE — G8428 CUR MEDS NOT DOCUMENT: HCPCS | Performed by: ANESTHESIOLOGY

## 2024-12-12 PROCEDURE — G8484 FLU IMMUNIZE NO ADMIN: HCPCS | Performed by: ANESTHESIOLOGY

## 2024-12-12 PROCEDURE — 99213 OFFICE O/P EST LOW 20 MIN: CPT | Performed by: ANESTHESIOLOGY

## 2024-12-12 PROCEDURE — 1123F ACP DISCUSS/DSCN MKR DOCD: CPT | Performed by: ANESTHESIOLOGY

## 2024-12-12 PROCEDURE — G8419 CALC BMI OUT NRM PARAM NOF/U: HCPCS | Performed by: ANESTHESIOLOGY

## 2024-12-12 PROCEDURE — 1036F TOBACCO NON-USER: CPT | Performed by: ANESTHESIOLOGY

## 2024-12-12 NOTE — PROGRESS NOTES
Chronic Pain Consult Note      Plan:     A comprehensive pain management plan may consist of the following: Testing, Therapy, Medications, Interventions, Consults, and Follow up.    Cervical spondylosis without myelopathy  Schedule bilateral C2-C4 MBB/RFA  Cervical radiculopathy  Status post interlaminar C5-6 KARINA  Potential series  Patient has not yet spoken to or been evaluated by a surgical specialist  Chronic neck pain  Encouraged continuation of active healthy lifestyle  Patient receives controlled medication from outside provider, PCP.  This includes tramadol as well as Norco.  Patient also receives Lyrica from outside provider.  He is taking these medications as directed and reports benefit from their use without side effects.    General Recommendations: The pain condition that the patient suffers from is best treated with a multidisciplinary approach that involves an increase in physical activity to prevent de-conditioning and worsening of the pain cycle, as well as psychological counseling (formal and/or informal) to address the co morbid psychological effects of pain. Treatment will often involve judicious use of pain medications and interventional procedures to decrease the pain, allowing the patient to participate in the physical activity that will ultimately produce long-lasting pain reductions. The goal of the multidisciplinary approach is to return the patient to a higher level of overall function and to restore their ability to perform activities of daily living.      Referring Provider: No ref. provider found  Assessment:      Chief Complaint: Follow-up      Simón Acevedo is a 69 y.o. male being [facility] for the following diagnoses:    Diagnosis:  No diagnosis found.      Subjective:      HPI:  Mr. Acevedo is seen in consultation at the request of No ref. provider found for evaluation and recommendations regarding the above diagnoses and the below HPI.    Updates since

## 2025-03-11 ENCOUNTER — OFFICE VISIT (OUTPATIENT)
Age: 70
End: 2025-03-11
Payer: MEDICARE

## 2025-03-11 DIAGNOSIS — M47.812 CERVICAL SPONDYLOSIS WITHOUT MYELOPATHY: Primary | ICD-10-CM

## 2025-03-11 PROCEDURE — 64491 INJ PARAVERT F JNT C/T 2 LEV: CPT | Performed by: ANESTHESIOLOGY

## 2025-03-11 PROCEDURE — 64490 INJ PARAVERT F JNT C/T 1 LEV: CPT | Performed by: ANESTHESIOLOGY

## 2025-03-11 NOTE — PROGRESS NOTES
Procedure Date: 3/11/25      Location: [facility]       Procedure: BILATERAL C2-4 MBB #1       Time Out performed prior to start of the procedure:       Harsha Henley MD performed the following reviews on Simón Acevedo 1955 prior to the start of the procedure:       patient was identified by name and     agreement on procedure being performed was verified   risks and benefits explained to patient by the provider  procedure site verified as Bilateral  patient was positioned for comfort   consent signed and verified for procedure       Time:  9:30 am        Procedure performed by:   Harsha Henley MD      Patient assisted by:   FIONA ROBLES MA

## 2025-03-11 NOTE — PROGRESS NOTES
DACMBBBL   Name: Simón Acevedo   MRN:[id number]   :1955    Location: 390    Procedure: Bilateral Cervical Medial Branch Block at C2-4 Under Fluoroscopic Imaging     Pre-op Diagnosis: Cervical Spondylosis without Myelopathy    Post-op Diagnosis: Same     Anesthesia: Local only     Complications: None    After confirming written and informed consent and discussing the risk, benefits and alternatives for the procedure, the patient had the correct site marked by the physician performing the procedure. The specific risks of bleeding and infection were discussed. The patient was taken to the fluoroscopy suite. A pulse oximeter was placed, and visual and verbal monitoring were maintained throughout the procedure. The patient was then placed in the prone position. The skin overlying the cervical spine was then prepped with chlorhexidine gluconate and a sterile drape was placed. The hands were cleaned with an alcohol-containing solution. Sterile gloves were then worn. A time out was then performed involving the physician, radiation technologist and the patient.    A posterior view of the right C2 articular pillar was then obtained. The skin overlying this area was anesthetized with 0.2 ml of 1% lidocaine using a 25 G 1.5 inch needle. Next, using intermittent fluoroscopic guidance, a 25 G, 2 inch Quincke needle was advanced using a coaxial technique toward the middle one-third section of the pillar with respect to the cephalad/caudad relationship and anterior/posterior relationship of the pillar. Satisfactory needle position was confirmed on AP, lateral, and oblique visualizations. 0.3 ml of 0.25% bupivacaine was then injected.    A posterior view of the right C3 articular pillar was then obtained. The skin overlying this area was anesthetized with 0.2 ml of 1% lidocaine using a 25 G 1.5 inch needle. Next, using intermittent fluoroscopic guidance, a 25 G, 2 inch Quincke needle was advanced using a coaxial technique

## 2025-03-12 ENCOUNTER — TELEPHONE (OUTPATIENT)
Age: 70
End: 2025-03-12

## 2025-03-12 NOTE — TELEPHONE ENCOUNTER
Procedure: Bilateral C2-4 MBB #1      Pt stated that his pain increased as the day went on.  He does not wish to proceed with the next block.

## 2025-04-02 ENCOUNTER — OFFICE VISIT (OUTPATIENT)
Age: 70
End: 2025-04-02
Payer: MEDICARE

## 2025-04-02 DIAGNOSIS — M47.812 CERVICAL SPONDYLOSIS WITHOUT MYELOPATHY: Primary | ICD-10-CM

## 2025-04-02 PROCEDURE — 1123F ACP DISCUSS/DSCN MKR DOCD: CPT | Performed by: PHYSICIAN ASSISTANT

## 2025-04-02 PROCEDURE — 99214 OFFICE O/P EST MOD 30 MIN: CPT | Performed by: PHYSICIAN ASSISTANT

## 2025-04-02 RX ORDER — TIZANIDINE 2 MG/1
2-4 TABLET ORAL
Qty: 45 TABLET | Refills: 2 | Status: SHIPPED | OUTPATIENT
Start: 2025-04-02 | End: 2025-07-01

## 2025-04-02 NOTE — PROGRESS NOTES
Mr. Ramirez is a follow-up for cervical MBB which unfortunately made his pain worse rather than better.  He underwent cervical epidural steroid injection which only gave him about 25% pain relief.  Cervical MRI from August of last year showed severe left-sided foraminal stenosis at C4-5 and moderate disc osteophyte complex which effaces the anterior CSF at C5-6.  He has not had a surgical evaluation.

## 2025-04-02 NOTE — PROGRESS NOTES
Chronic Pain Consult Note      Plan:     A comprehensive pain management plan may consist of the following: Testing, Therapy, Medications, Interventions, Consults, and Follow up.    Cervical spondylosis without myelopathy  Failed C2-4 MBB - worsened pain.   Initiate trial of Tizanidine 2mg 1-2 PO QHS for sleep and spasm.   Refer to ortho spine for eval of surgery for decompression of spinal canal.   Cervical radiculopathy  Status post interlaminar C5-6 KARINA with about 25% pain relief   Potential series  Refer to ortho spine surgery.   Chronic neck pain  Encouraged continuation of active healthy lifestyle  Patient receives controlled medication from outside provider, PCP.  This includes tramadol as well as Norco.  Patient also receives Lyrica from outside provider.  He is taking these medications as directed and reports benefit from their use without side effects.    General Recommendations: The pain condition that the patient suffers from is best treated with a multidisciplinary approach that involves an increase in physical activity to prevent de-conditioning and worsening of the pain cycle, as well as psychological counseling (formal and/or informal) to address the co morbid psychological effects of pain. Treatment will often involve judicious use of pain medications and interventional procedures to decrease the pain, allowing the patient to participate in the physical activity that will ultimately produce long-lasting pain reductions. The goal of the multidisciplinary approach is to return the patient to a higher level of overall function and to restore their ability to perform activities of daily living.      Referring Provider: Supa Upton MD  Assessment:      Chief Complaint: No chief complaint on file.      Simón Acevedo is a 69 y.o. male being [facility] for the following diagnoses:    Diagnosis:  No diagnosis found.      Subjective:      HPI:  Mr. Acevedo is seen in

## 2025-04-07 ENCOUNTER — OFFICE VISIT (OUTPATIENT)
Age: 70
End: 2025-04-07
Payer: MEDICARE

## 2025-04-07 VITALS — HEIGHT: 74 IN | BODY MASS INDEX: 26.95 KG/M2 | WEIGHT: 210 LBS

## 2025-04-07 DIAGNOSIS — M54.12 CERVICAL RADICULOPATHY: Primary | ICD-10-CM

## 2025-04-07 PROCEDURE — 1123F ACP DISCUSS/DSCN MKR DOCD: CPT | Performed by: ORTHOPAEDIC SURGERY

## 2025-04-07 PROCEDURE — 1159F MED LIST DOCD IN RCRD: CPT | Performed by: ORTHOPAEDIC SURGERY

## 2025-04-07 PROCEDURE — 99215 OFFICE O/P EST HI 40 MIN: CPT | Performed by: ORTHOPAEDIC SURGERY

## 2025-04-07 NOTE — PROGRESS NOTES
Name: Simón Acevedo  YOB: 1955  Gender: male  MRN: [id number]  Age: 70 y.o.    Chief Complaint:   Chief Complaint   Patient presents with    New Patient     Cervical spondylosis      History of Present Illness  The patient is a 70-year-old male who presents for evaluation of neck pain. He is accompanied by his wife of 29 years.    A longstanding history of arthritis is reported, and he was referred to the clinic by Dr. Henley.  He tells me he had a fall off of the ladder 5 years ago which seemed to have started a lot of the symptoms.  The fall resulted in persistent dizziness and chronic headaches. Left-sided neck pain radiates into the left shoulder and to a lesser degree, into both arms. Occasional numbness in the fingers is noted. The primary concern is severe neck pain, which is accompanied by headaches and spasms that originate from the side of the neck and extend into the head. No nerve conduction study has been performed, and physical therapy is not currently being undertaken. Symptoms are reported to be more severe on the left side. A history of back pain is attributed to multiple injuries sustained over the years, including the fall from stairs. Pain management includes narcotics (Norco), Fioricet for headaches, and tizanidine for neck pain. Despite these interventions, symptoms have not improved. No history of stroke is reported. A history of smoking is noted, but cessation occurred 25 years ago.  Blindness in the left eye due to a baseball accident at the age of 11 is reported, with no enucleation surgery performed.  Patient has been seen and followed by Dr. Supa Upton with neurology and was definitively not felt to have Parkinson disease.    Patient has had C2-4 medial bundle branch blocks as well as a C5-6 epidural steroid injection.  The medial bundle branch blocks made his pain worse he says.  The epidural steroid injection in the cervical spine helped his symptoms very

## 2025-04-16 ENCOUNTER — PROCEDURE VISIT (OUTPATIENT)
Dept: NEUROLOGY | Age: 70
End: 2025-04-16

## 2025-04-16 DIAGNOSIS — M54.2 NECK PAIN: Primary | ICD-10-CM

## 2025-04-16 DIAGNOSIS — M79.621 PAIN IN BOTH UPPER ARMS: ICD-10-CM

## 2025-04-16 DIAGNOSIS — G56.22 CUBITAL TUNNEL SYNDROME ON LEFT: ICD-10-CM

## 2025-04-16 DIAGNOSIS — M79.622 PAIN IN BOTH UPPER ARMS: ICD-10-CM

## 2025-04-16 DIAGNOSIS — G56.21 CUBITAL TUNNEL SYNDROME ON RIGHT: ICD-10-CM

## 2025-04-16 DIAGNOSIS — G56.01 MILD CARPAL TUNNEL SYNDROME, RIGHT: ICD-10-CM

## 2025-04-16 DIAGNOSIS — R25.1 TREMOR OF BOTH HANDS: ICD-10-CM

## 2025-04-16 NOTE — PROGRESS NOTES
EMG/Nerve Conduction Study Procedure Note  [address]    [address]  [place]  [address]   [phone]      Hx:    Exam:     70 y.o. m w  male     EMG::    BUE.  Accompanied by wife = paresthesias hands neck pain cervicalgia.  Bilateral Jeri's.  No Bruce on the right.  Left eye damaged.  Generalized action tremor/essential tremor.  Accompanied by wife who corroborates.  Referring:    Dr. Lloyd Upton neurology  Technologist ::   Mounika Thomas  Hgt:      6 foot 2 inch        Summary   needle EMG upper extremity selected muscles with CV studies as noted below.                   Controlled environmental factors / EMG lab.  Temperature.   NCV : sensory segments:          Abnormal = slowed right median distal SCV.  Normal left median bilateral ulnar and radial SCV.  NCV transcarpal sensory segments:        Abnormal = slowed bilateral median nerve with normal ulnar transcarpal SCV .  Peak difference of latency on the right at 0.60 ms (UL = 0.20 ms); left side 0.29 ms.   NCV Motor MCV segments:      Abnormal = slowed bilateral ulnar MCV at the elbow with attenuated CMAP and some conduction blocking.  Essentially normal bilateral median MCV with small/attenuated CMAP amplitudes.      F-wave studies:        Abnormal = prolonged left ulnar F waves.  Borderline prolonged ulnar F waves on the right.  Basically normal median F waves.   NEEDLE EMG:   Tested muscles::       Normal bilateral FCU FDI APB ADM EDC triceps deltoid biceps muscles.  No fibrillation positive sharp waves or fasciculations with normal recruitment.             INTERPRETATION:    ELECTROPHYSIOLOGIC EVIDENCE OF MODERATE BILATERAL ENTRAPMENT OF THE ULNAR NERVES AT THE CUBITAL SEGMENTS WITH NO AXONAL DENERVATION.   MILD MEDIAN NERVE ENTRAPMENT BILATERALLY AT THE CARPAL SEGMENTS.  NO AXONAL DENERVATION IDENTIFIED.  NO CERVICAL RADICULOPATHY, PLEXOPATHY ETC.      CONCLUSION:      Compatible with moderate cubital

## 2025-05-30 ENCOUNTER — OFFICE VISIT (OUTPATIENT)
Dept: NEUROLOGY | Age: 70
End: 2025-05-30

## 2025-05-30 VITALS
HEART RATE: 59 BPM | SYSTOLIC BLOOD PRESSURE: 157 MMHG | HEIGHT: 74 IN | BODY MASS INDEX: 26.96 KG/M2 | DIASTOLIC BLOOD PRESSURE: 79 MMHG

## 2025-05-30 DIAGNOSIS — M54.2 CHRONIC NECK PAIN WITH ABNORMAL NEUROLOGIC EXAMINATION: Primary | ICD-10-CM

## 2025-05-30 DIAGNOSIS — M50.30 DDD (DEGENERATIVE DISC DISEASE), CERVICAL: ICD-10-CM

## 2025-05-30 DIAGNOSIS — R25.1 TREMORS OF NERVOUS SYSTEM: ICD-10-CM

## 2025-05-30 DIAGNOSIS — G89.29 CHRONIC NECK PAIN WITH ABNORMAL NEUROLOGIC EXAMINATION: Primary | ICD-10-CM

## 2025-05-30 ASSESSMENT — ENCOUNTER SYMPTOMS
ABDOMINAL PAIN: 0
SHORTNESS OF BREATH: 0

## 2025-05-30 NOTE — PROGRESS NOTES
[facility] NEUROLOGY  [address], [address]  [place] [address]  Phone: [phone] Fax [phone]  Supa Upton MD      Patient: Simón Acevedo  Provider: Supa Upton MD    CC:   Chief Complaint   Patient presents with    Follow-up    Tremors     Referring Provider:    History of Present Illness:     Simón Acevedo is a 70 y.o. RH male who presents for follow-up of tremors.     He is accompanied by his spouse.  He has not been seen in the office since July 2024.     Patient presents for follow-up and continued management of a tremor of the hands, R>L in addition to chronic pain.    He has a family history of Parkinson's disease in his mother, though we have noted very little objective evidence of parkinsonism and he also had a normal DaTscan.    No significant changes since the last visit and most of his complaints continue to involve chronic neck pain and some degree of bilateral upper extremity pain which is presumed to be secondary to a chronic radiculopathy.    Recent electrodiagnostic studies of the upper extremities have shown mild carpal tunnel changes and possibly a cubital tunnel syndrome, but his clinical picture continues to be mostly localized to the cervical spine.    Patient reports seeing Dr. Rodriguez and these records have been reviewed; previously failed some medial branch blocks with Dr. Hneley.    Previous imaging has been reviewed.  Brain imaging has shown some slight dural thickening which is stable and unchanged from previous scans.  Cervical spine continues to show degenerative changes greatest at C4-5 and C5-6.  Prior CSF studies are unremarkable.    He does complain of some chronic headaches which I suspect to be a cervicogenic mechanism as he has no other prior history of migraines.    He does note some mild cognitive issues but these continue to be relatively mild and I suspect that has been multifactorial in the setting of his chronic pain and chronic sleep